# Patient Record
Sex: MALE | Race: WHITE
[De-identification: names, ages, dates, MRNs, and addresses within clinical notes are randomized per-mention and may not be internally consistent; named-entity substitution may affect disease eponyms.]

---

## 2021-05-24 ENCOUNTER — NURSE TRIAGE (OUTPATIENT)
Dept: OTHER | Facility: CLINIC | Age: 31
End: 2021-05-24

## 2021-05-24 NOTE — TELEPHONE ENCOUNTER
Called with questions about his coverage seeing as he did not get a new card this year. Unable to find him in [employer]    Referred him to call [phone] and speak with an associate.  Performed a direct transfer for him    No triage    Reason for Disposition   Information only call; adult is not ill or injured   General information question, no triage required and triager able to answer question    Protocols used: NO GUIDELINE OR REFERENCE AVAILABLE-ADULT-, INFORMATION ONLY CALL - NO TRIAGE-ADULT-